# Patient Record
Sex: FEMALE | URBAN - METROPOLITAN AREA
[De-identification: names, ages, dates, MRNs, and addresses within clinical notes are randomized per-mention and may not be internally consistent; named-entity substitution may affect disease eponyms.]

---

## 2024-05-14 ENCOUNTER — CLINICAL DOCUMENTATION (OUTPATIENT)
Age: 24
End: 2024-05-14

## 2024-05-14 NOTE — PROGRESS NOTES
Records received from [facility], JOLIE Scott NP and per updated office protocol do not meet criteria for further review. Schedule next available for Hepatic Steatosis, mild fibrosis